# Patient Record
Sex: MALE | Race: WHITE
[De-identification: names, ages, dates, MRNs, and addresses within clinical notes are randomized per-mention and may not be internally consistent; named-entity substitution may affect disease eponyms.]

---

## 2023-02-25 ENCOUNTER — HOSPITAL ENCOUNTER (INPATIENT)
Dept: HOSPITAL 12 - ER | Age: 58
LOS: 3 days | Discharge: HOME | DRG: 919 | End: 2023-02-28
Payer: COMMERCIAL

## 2023-02-25 VITALS — WEIGHT: 140 LBS | BODY MASS INDEX: 20.04 KG/M2 | HEIGHT: 70 IN

## 2023-02-25 DIAGNOSIS — Z20.822: ICD-10-CM

## 2023-02-25 DIAGNOSIS — T85.614A: Primary | ICD-10-CM

## 2023-02-25 DIAGNOSIS — B96.89: ICD-10-CM

## 2023-02-25 DIAGNOSIS — E86.0: ICD-10-CM
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
## 2023-02-25 DIAGNOSIS — F17.210: ICD-10-CM

## 2023-02-25 DIAGNOSIS — E87.6: ICD-10-CM

## 2023-02-25 DIAGNOSIS — Z71.6: ICD-10-CM
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
## 2023-02-25 DIAGNOSIS — N39.0: ICD-10-CM

## 2023-02-25 DIAGNOSIS — N17.0: ICD-10-CM

## 2023-02-25 DIAGNOSIS — E78.5: ICD-10-CM

## 2023-02-25 DIAGNOSIS — Z79.4: ICD-10-CM

## 2023-02-25 DIAGNOSIS — E11.10: ICD-10-CM

## 2023-02-25 DIAGNOSIS — Z79.899: ICD-10-CM

## 2023-02-25 LAB
ALP SERPL-CCNC: 144 U/L (ref 50–136)
ALT SERPL W/O P-5'-P-CCNC: 55 U/L (ref 16–63)
APPEARANCE UR: CLEAR
AST SERPL-CCNC: 60 U/L (ref 15–37)
BILIRUB DIRECT SERPL-MCNC: 0.1 MG/DL (ref 0–0.2)
BILIRUB SERPL-MCNC: 0.7 MG/DL (ref 0.2–1)
BILIRUB UR QL STRIP: (no result)
BUN SERPL-MCNC: 24 MG/DL (ref 7–18)
CHLORIDE SERPL-SCNC: 92 MMOL/L (ref 98–107)
CO2 SERPL-SCNC: 17 MMOL/L (ref 21–32)
COLOR UR: YELLOW
CREAT SERPL-MCNC: 1.7 MG/DL (ref 0.6–1.3)
GLUCOSE SERPL-MCNC: 408 MG/DL (ref 74–106)
GLUCOSE UR STRIP-MCNC: (no result) MG/DL
HCT VFR BLD AUTO: 38 % (ref 36.7–47.1)
HGB UR QL STRIP: (no result)
KETONES UR STRIP-MCNC: (no result) MG/DL
LEUKOCYTE ESTERASE UR QL STRIP: (no result)
LIPASE SERPL-CCNC: 89 U/L (ref 73–393)
MCH RBC QN AUTO: 35.3 UUG (ref 23.8–33.4)
MCV RBC AUTO: 105.5 FL (ref 73–96.2)
NITRITE UR QL STRIP: NEGATIVE
PH UR STRIP: 5.5 [PH] (ref 5–8)
PLATELET # BLD AUTO: 389 K/UL (ref 152–348)
POTASSIUM SERPL-SCNC: 4.8 MMOL/L (ref 3.5–5.1)
SP GR UR STRIP: 1.02 (ref 1–1.03)
UROBILINOGEN UR STRIP-MCNC: 0.2 E.U./DL
WS STN SPEC: 8.1 G/DL (ref 6.4–8.2)

## 2023-02-25 PROCEDURE — A4663 DIALYSIS BLOOD PRESSURE CUFF: HCPCS

## 2023-02-25 PROCEDURE — C9113 INJ PANTOPRAZOLE SODIUM, VIA: HCPCS

## 2023-02-25 PROCEDURE — G0378 HOSPITAL OBSERVATION PER HR: HCPCS

## 2023-02-25 PROCEDURE — A9150 MISC/EXPER NON-PRESCRIPT DRU: HCPCS

## 2023-02-25 NOTE — NUR
Pt states doesn't remember names of his HTN, high cholesterol, and anti 
depressant medications. Needs further follow up.

## 2023-02-26 VITALS — SYSTOLIC BLOOD PRESSURE: 102 MMHG | DIASTOLIC BLOOD PRESSURE: 71 MMHG

## 2023-02-26 VITALS — SYSTOLIC BLOOD PRESSURE: 111 MMHG | DIASTOLIC BLOOD PRESSURE: 46 MMHG

## 2023-02-26 VITALS — DIASTOLIC BLOOD PRESSURE: 78 MMHG | SYSTOLIC BLOOD PRESSURE: 121 MMHG

## 2023-02-26 VITALS — SYSTOLIC BLOOD PRESSURE: 144 MMHG | DIASTOLIC BLOOD PRESSURE: 58 MMHG

## 2023-02-26 VITALS — SYSTOLIC BLOOD PRESSURE: 111 MMHG | DIASTOLIC BLOOD PRESSURE: 70 MMHG

## 2023-02-26 LAB
ALP SERPL-CCNC: 119 U/L (ref 50–136)
ALT SERPL W/O P-5'-P-CCNC: 40 U/L (ref 16–63)
AST SERPL-CCNC: 31 U/L (ref 15–37)
BILIRUB DIRECT SERPL-MCNC: 0.1 MG/DL (ref 0–0.2)
BILIRUB SERPL-MCNC: 0.5 MG/DL (ref 0.2–1)
BUN SERPL-MCNC: 13 MG/DL (ref 7–18)
BUN SERPL-MCNC: 13 MG/DL (ref 7–18)
BUN SERPL-MCNC: 14 MG/DL (ref 7–18)
BUN SERPL-MCNC: 16 MG/DL (ref 7–18)
BUN SERPL-MCNC: 18 MG/DL (ref 7–18)
BUN SERPL-MCNC: 18 MG/DL (ref 7–18)
BUN SERPL-MCNC: 21 MG/DL (ref 7–18)
CHLORIDE SERPL-SCNC: 101 MMOL/L (ref 98–107)
CHLORIDE SERPL-SCNC: 102 MMOL/L (ref 98–107)
CHLORIDE SERPL-SCNC: 102 MMOL/L (ref 98–107)
CHLORIDE SERPL-SCNC: 103 MMOL/L (ref 98–107)
CHLORIDE SERPL-SCNC: 98 MMOL/L (ref 98–107)
CO2 SERPL-SCNC: 16 MMOL/L (ref 21–32)
CO2 SERPL-SCNC: 19 MMOL/L (ref 21–32)
CO2 SERPL-SCNC: 20 MMOL/L (ref 21–32)
CO2 SERPL-SCNC: 20 MMOL/L (ref 21–32)
CO2 SERPL-SCNC: 21 MMOL/L (ref 21–32)
CO2 SERPL-SCNC: 23 MMOL/L (ref 21–32)
CO2 SERPL-SCNC: 23 MMOL/L (ref 21–32)
CREAT SERPL-MCNC: 1.1 MG/DL (ref 0.6–1.3)
CREAT SERPL-MCNC: 1.2 MG/DL (ref 0.6–1.3)
CREAT SERPL-MCNC: 1.2 MG/DL (ref 0.6–1.3)
CREAT SERPL-MCNC: 1.3 MG/DL (ref 0.6–1.3)
CREAT SERPL-MCNC: 1.3 MG/DL (ref 0.6–1.3)
DEPRECATED SQUAMOUS URNS QL MICRO: (no result) /HPF
GLUCOSE SERPL-MCNC: 144 MG/DL (ref 74–106)
GLUCOSE SERPL-MCNC: 152 MG/DL (ref 74–106)
GLUCOSE SERPL-MCNC: 225 MG/DL (ref 74–106)
GLUCOSE SERPL-MCNC: 256 MG/DL (ref 74–106)
GLUCOSE SERPL-MCNC: 269 MG/DL (ref 74–106)
GLUCOSE SERPL-MCNC: 302 MG/DL (ref 74–106)
GLUCOSE SERPL-MCNC: 99 MG/DL (ref 74–106)
HCT VFR BLD AUTO: 36.4 % (ref 36.7–47.1)
MAGNESIUM SERPL-MCNC: 1.8 MG/DL (ref 1.8–2.4)
MAGNESIUM SERPL-MCNC: 1.9 MG/DL (ref 1.8–2.4)
MAGNESIUM SERPL-MCNC: 1.9 MG/DL (ref 1.8–2.4)
MCH RBC QN AUTO: 35 UUG (ref 23.8–33.4)
MCV RBC AUTO: 105.5 FL (ref 73–96.2)
PHOSPHATE SERPL-MCNC: 1.4 MG/DL (ref 2.5–4.9)
PHOSPHATE SERPL-MCNC: 1.7 MG/DL (ref 2.5–4.9)
PHOSPHATE SERPL-MCNC: 1.9 MG/DL (ref 2.5–4.9)
PHOSPHATE SERPL-MCNC: 2.4 MG/DL (ref 2.5–4.9)
PHOSPHATE SERPL-MCNC: 2.4 MG/DL (ref 2.5–4.9)
PHOSPHATE SERPL-MCNC: 3.1 MG/DL (ref 2.5–4.9)
PLATELET # BLD AUTO: 349 K/UL (ref 152–348)
POTASSIUM SERPL-SCNC: 3.8 MMOL/L (ref 3.5–5.1)
POTASSIUM SERPL-SCNC: 4 MMOL/L (ref 3.5–5.1)
POTASSIUM SERPL-SCNC: 4.2 MMOL/L (ref 3.5–5.1)
POTASSIUM SERPL-SCNC: 4.3 MMOL/L (ref 3.5–5.1)
POTASSIUM SERPL-SCNC: 4.7 MMOL/L (ref 3.5–5.1)
POTASSIUM SERPL-SCNC: 4.7 MMOL/L (ref 3.5–5.1)
POTASSIUM SERPL-SCNC: 5 MMOL/L (ref 3.5–5.1)
TSH SERPL DL<=0.005 MIU/L-ACNC: 2.42 MIU/ML (ref 0.36–3.74)
WBC #/AREA URNS HPF: (no result) /HPF
WBC #/AREA URNS HPF: (no result) /HPF (ref 0–3)
WS STN SPEC: 7 G/DL (ref 6.4–8.2)

## 2023-02-26 RX ADMIN — Medication SCH EACH: at 08:18

## 2023-02-26 RX ADMIN — Medication SCH EACH: at 07:00

## 2023-02-26 RX ADMIN — SODIUM CHLORIDE PRN MG: 9 INJECTION, SOLUTION INTRAVENOUS at 11:22

## 2023-02-26 RX ADMIN — SODIUM CHLORIDE PRN UNIT: 9 INJECTION, SOLUTION INTRAVENOUS at 14:14

## 2023-02-26 RX ADMIN — Medication SCH EACH: at 11:07

## 2023-02-26 RX ADMIN — Medication SCH EACH: at 09:17

## 2023-02-26 RX ADMIN — SODIUM CHLORIDE PRN MG: 9 INJECTION, SOLUTION INTRAVENOUS at 11:54

## 2023-02-26 RX ADMIN — INSULIN HUMAN PRN UNITS: 100 INJECTION, SOLUTION PARENTERAL at 21:06

## 2023-02-26 RX ADMIN — Medication SCH EACH: at 12:15

## 2023-02-26 RX ADMIN — Medication SCH EACH: at 10:16

## 2023-02-26 RX ADMIN — Medication SCH EACH: at 16:28

## 2023-02-26 RX ADMIN — DEXTROSE SCH MLS/HR: 50 INJECTION, SOLUTION INTRAVENOUS at 02:55

## 2023-02-26 RX ADMIN — Medication SCH EACH: at 21:10

## 2023-02-26 RX ADMIN — SODIUM CHLORIDE PRN MG: 9 INJECTION, SOLUTION INTRAVENOUS at 11:56

## 2023-02-26 RX ADMIN — DEXTROSE SCH MG: 50 INJECTION, SOLUTION INTRAVENOUS at 09:31

## 2023-02-26 RX ADMIN — ENOXAPARIN SODIUM SCH MG: 40 INJECTION SUBCUTANEOUS at 02:45

## 2023-02-26 RX ADMIN — ENOXAPARIN SODIUM SCH MG: 40 INJECTION SUBCUTANEOUS at 21:02

## 2023-02-26 NOTE — NUR
Humulin R 100 unit in IV Sodium Chloride 0.9% 99ml reconstituted and started at 
0558am. 2 RN verification prior to administration (Charge Nurse Kyaw LOPEZ RN). 
Insulin drip running @ 5.74 units/hr

Accucheck 287

## 2023-02-26 NOTE — NUR
Per MD's order, cancel D5 .45%NS with 20meqs potassium and gave 12 units of 
regular insulin per sliding scale.

## 2023-02-26 NOTE — NUR
Blood glucose was 93. Held insulin drip, changed NS to D5 1/2 NS @ 175ml/hr. 
Stat BMP ordered per protocol. BP = 126/68, HR = 70. Pt experiencing mild 
headache. Paged [facility] and txted Dr. Obregon. Pending call back.

## 2023-02-26 NOTE — NUR
Per house supervisor, no ICU beds avaliable. Patient will be held in the ER. ER 
charting will be done.

## 2023-02-26 NOTE — NUR
Dr. Vincent verbal order of BMP in 1hr. If Bicarb >20, stop insulin drip 
and give lantus 10units x 1. Also order sliding scale moderate ACS.

## 2023-02-26 NOTE — NUR
Paged Dr. Obregon to inform about changing the dose of insulin drip to 2.74, 
blood glucose was 137. No call back yet.

## 2023-02-26 NOTE — NUR
Placed pt on monitor. V/S: BP = 154/65, HR = 69, RR = 18, O2 Sat = 96%. No 
signs of lethargy, denies n/v and dizziness.

## 2023-02-26 NOTE — NUR
Pt transported to [facility] 2 via hospital bed, accompanied by 2 RNs, under the 
medical care of Dr. Hu Vincent. Pt in stable condition, V/S ABYLsiobhan AOx4, denies headache, blurry vision, n/v. Received by Radha CERON. 

-------------------------------------------------------------------------------

Addendum: 02/26/23 at 1901 by DAGOBERTO

-------------------------------------------------------------------------------

Pt transported to CC[facility] 2 via hospital bed, accompanied by 2 RNs, under the 
medical care of Dr. Hu Vincent. Pt in guarded condition, V/S WNLsiobhan AOx4, denies headache, blurry vision, n/v. Received by Radha CERON.



BP = 100/58

HR = 79

RR = 20

O2 SAT = 95%

## 2023-02-26 NOTE — NUR
Informed Dr. Vincent, bicarb is 21. Ordered to stop insulin rip, give 
lantus 10 units x1, sliding scale moderate achs, complete infusing D5 1/2 NS 
with 20 meqs potassium and stop fluids if pt is tolerating full liquid diet. 
Orders noted and carried out. Informed pharmacy as well, pharmacist Raymon was 
notified.

## 2023-02-26 NOTE — NUR
Called [facility] for panel call. Taisha on call. Waiting for call back.

## 2023-02-26 NOTE — NUR
RECEIVED IN BED , ABLE TO MAKE NEEDS KNOWN

ALL ORDERS NOTED, BLOOD SUGAR IS 217MG/DL 4 UNITS GIVEN.

NO SIGNS OF DISTRESS.

## 2023-02-27 VITALS — DIASTOLIC BLOOD PRESSURE: 78 MMHG | SYSTOLIC BLOOD PRESSURE: 121 MMHG

## 2023-02-27 VITALS — SYSTOLIC BLOOD PRESSURE: 139 MMHG | DIASTOLIC BLOOD PRESSURE: 77 MMHG

## 2023-02-27 VITALS — SYSTOLIC BLOOD PRESSURE: 136 MMHG | DIASTOLIC BLOOD PRESSURE: 58 MMHG

## 2023-02-27 VITALS — DIASTOLIC BLOOD PRESSURE: 72 MMHG | SYSTOLIC BLOOD PRESSURE: 145 MMHG

## 2023-02-27 VITALS — SYSTOLIC BLOOD PRESSURE: 145 MMHG | DIASTOLIC BLOOD PRESSURE: 54 MMHG

## 2023-02-27 VITALS — SYSTOLIC BLOOD PRESSURE: 151 MMHG | DIASTOLIC BLOOD PRESSURE: 78 MMHG

## 2023-02-27 VITALS — SYSTOLIC BLOOD PRESSURE: 111 MMHG | DIASTOLIC BLOOD PRESSURE: 56 MMHG

## 2023-02-27 LAB
ALP SERPL-CCNC: 102 U/L (ref 50–136)
ALT SERPL W/O P-5'-P-CCNC: 29 U/L (ref 16–63)
AST SERPL-CCNC: 23 U/L (ref 15–37)
BILIRUB SERPL-MCNC: 0.2 MG/DL (ref 0.2–1)
BUN SERPL-MCNC: 11 MG/DL (ref 7–18)
CHLORIDE SERPL-SCNC: 99 MMOL/L (ref 98–107)
CO2 SERPL-SCNC: 21 MMOL/L (ref 21–32)
CREAT SERPL-MCNC: 1.1 MG/DL (ref 0.6–1.3)
GLUCOSE SERPL-MCNC: 250 MG/DL (ref 74–106)
HCT VFR BLD AUTO: 33.5 % (ref 36.7–47.1)
MAGNESIUM SERPL-MCNC: 1.7 MG/DL (ref 1.8–2.4)
MCH RBC QN AUTO: 35.1 UUG (ref 23.8–33.4)
MCV RBC AUTO: 103.5 FL (ref 73–96.2)
PHOSPHATE SERPL-MCNC: 2.1 MG/DL (ref 2.5–4.9)
PLATELET # BLD AUTO: 293 K/UL (ref 152–348)
POTASSIUM SERPL-SCNC: 3.9 MMOL/L (ref 3.5–5.1)
WS STN SPEC: 6.5 G/DL (ref 6.4–8.2)

## 2023-02-27 RX ADMIN — Medication SCH EACH: at 12:13

## 2023-02-27 RX ADMIN — Medication SCH EACH: at 21:48

## 2023-02-27 RX ADMIN — DEXTROSE SCH MG: 50 INJECTION, SOLUTION INTRAVENOUS at 09:04

## 2023-02-27 RX ADMIN — INSULIN HUMAN PRN UNITS: 100 INJECTION, SOLUTION PARENTERAL at 21:38

## 2023-02-27 RX ADMIN — SODIUM CHLORIDE PRN UNIT: 9 INJECTION, SOLUTION INTRAVENOUS at 09:08

## 2023-02-27 RX ADMIN — SODIUM CHLORIDE PRN UNIT: 9 INJECTION, SOLUTION INTRAVENOUS at 12:20

## 2023-02-27 RX ADMIN — ENOXAPARIN SODIUM SCH MG: 40 INJECTION SUBCUTANEOUS at 21:47

## 2023-02-27 RX ADMIN — Medication SCH EACH: at 07:30

## 2023-02-27 RX ADMIN — INSULIN GLARGINE SCH UNITS: 100 INJECTION, SOLUTION SUBCUTANEOUS at 12:21

## 2023-02-27 RX ADMIN — Medication SCH EACH: at 16:30

## 2023-02-27 RX ADMIN — INSULIN GLARGINE SCH UNITS: 100 INJECTION, SOLUTION SUBCUTANEOUS at 21:40

## 2023-02-27 RX ADMIN — DEXTROSE SCH MLS/HR: 50 INJECTION, SOLUTION INTRAVENOUS at 02:35

## 2023-02-27 NOTE — NUR
Bedtime , given Humulin R and Lantus as ordered. Recheck [id number] reported to Dr Savage with orders to give 15 more units Humulin R and 8 units Lantus, noted and 
carried out. Continue to monitor.

## 2023-02-28 VITALS — SYSTOLIC BLOOD PRESSURE: 150 MMHG | DIASTOLIC BLOOD PRESSURE: 62 MMHG

## 2023-02-28 VITALS — DIASTOLIC BLOOD PRESSURE: 79 MMHG | SYSTOLIC BLOOD PRESSURE: 134 MMHG

## 2023-02-28 VITALS — SYSTOLIC BLOOD PRESSURE: 128 MMHG | DIASTOLIC BLOOD PRESSURE: 80 MMHG

## 2023-02-28 VITALS — DIASTOLIC BLOOD PRESSURE: 78 MMHG | SYSTOLIC BLOOD PRESSURE: 140 MMHG

## 2023-02-28 VITALS — DIASTOLIC BLOOD PRESSURE: 72 MMHG | SYSTOLIC BLOOD PRESSURE: 132 MMHG

## 2023-02-28 LAB
BUN SERPL-MCNC: 9 MG/DL (ref 7–18)
CHLORIDE SERPL-SCNC: 99 MMOL/L (ref 98–107)
CO2 SERPL-SCNC: 23 MMOL/L (ref 21–32)
CREAT SERPL-MCNC: 0.9 MG/DL (ref 0.6–1.3)
GLUCOSE SERPL-MCNC: 131 MG/DL (ref 74–106)
MAGNESIUM SERPL-MCNC: 1.7 MG/DL (ref 1.8–2.4)
PHOSPHATE SERPL-MCNC: 3.1 MG/DL (ref 2.5–4.9)
POTASSIUM SERPL-SCNC: 3.2 MMOL/L (ref 3.5–5.1)

## 2023-02-28 RX ADMIN — DEXTROSE SCH MLS/HR: 50 INJECTION, SOLUTION INTRAVENOUS at 02:06

## 2023-02-28 RX ADMIN — Medication SCH EACH: at 06:34

## 2023-02-28 RX ADMIN — SODIUM CHLORIDE PRN UNIT: 9 INJECTION, SOLUTION INTRAVENOUS at 18:05

## 2023-02-28 RX ADMIN — SODIUM CHLORIDE PRN UNIT: 9 INJECTION, SOLUTION INTRAVENOUS at 13:28

## 2023-02-28 RX ADMIN — MAGNESIUM SULFATE IN DEXTROSE SCH MLS/HR: 10 INJECTION, SOLUTION INTRAVENOUS at 10:18

## 2023-02-28 RX ADMIN — Medication SCH EACH: at 16:30

## 2023-02-28 RX ADMIN — Medication SCH EACH: at 11:30

## 2023-02-28 RX ADMIN — MAGNESIUM SULFATE IN DEXTROSE SCH MLS/HR: 10 INJECTION, SOLUTION INTRAVENOUS at 11:41

## 2023-02-28 RX ADMIN — INSULIN GLARGINE SCH UNITS: 100 INJECTION, SOLUTION SUBCUTANEOUS at 10:17

## 2023-02-28 RX ADMIN — SODIUM CHLORIDE PRN UNIT: 9 INJECTION, SOLUTION INTRAVENOUS at 10:15

## 2023-02-28 NOTE — NUR
DISCHARGE  INSTRUCTIONS GIVEN TO PT..PT STATED HE UNDERSTOOD DISCHARGE INSTRUCTIONS..PT 
STATED A FRIEND WILL PICK HIM UP.. PT LEFT UNIT IN STABLE CONDITION..